# Patient Record
Sex: FEMALE | Race: WHITE | NOT HISPANIC OR LATINO | URBAN - METROPOLITAN AREA
[De-identification: names, ages, dates, MRNs, and addresses within clinical notes are randomized per-mention and may not be internally consistent; named-entity substitution may affect disease eponyms.]

---

## 2017-04-15 ENCOUNTER — EMERGENCY (EMERGENCY)
Facility: HOSPITAL | Age: 62
LOS: 1 days | Discharge: HOME | End: 2017-04-15
Attending: EMERGENCY MEDICINE
Payer: COMMERCIAL

## 2017-04-15 VITALS
WEIGHT: 207.01 LBS | TEMPERATURE: 98 F | SYSTOLIC BLOOD PRESSURE: 152 MMHG | OXYGEN SATURATION: 99 % | HEART RATE: 71 BPM | DIASTOLIC BLOOD PRESSURE: 87 MMHG | RESPIRATION RATE: 16 BRPM | HEIGHT: 65 IN

## 2017-04-15 DIAGNOSIS — S83.8X1A SPRAIN OF OTHER SPECIFIED PARTS OF RIGHT KNEE, INITIAL ENCOUNTER: ICD-10-CM
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
## 2017-04-15 DIAGNOSIS — S89.91XA UNSPECIFIED INJURY OF RIGHT LOWER LEG, INITIAL ENCOUNTER: ICD-10-CM
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
## 2017-04-15 PROCEDURE — 99283 EMERGENCY DEPT VISIT LOW MDM: CPT

## 2017-04-15 PROCEDURE — 73564 X-RAY EXAM KNEE 4 OR MORE: CPT | Mod: 26,RT

## 2017-04-15 PROCEDURE — 99283 EMERGENCY DEPT VISIT LOW MDM: CPT | Mod: 25

## 2017-04-15 PROCEDURE — 73564 X-RAY EXAM KNEE 4 OR MORE: CPT

## 2017-04-15 NOTE — ED STATDOCS - OBJECTIVE STATEMENT
60 y/o F pt presents to ED c/o right knee pain since yesterday. Pt reports she bent down yesterday and heard/felt something pop in the back of her knee. She then was walking down the stairs when her knee gave out again and she fell with no LOC or head injury. Pain worsens with walking. Pt denies recent trauma, nausea, vomiting, calf pain, and back pain. No further complaints or injuries. NKDA.

## 2017-04-15 NOTE — ED STATDOCS - ATTENDING CONTRIBUTION TO CARE
I performed the initial face to face bedside interview with this patient regarding history of present illness, review of symptoms and past medical, social and family history.  I completed an independent physical examination.  I was the initial provider who evaluated this patient.  The history, review of symptoms and examination was documented by the scribe in my presence and I attest to the accuracy of the documentation.  I have signed out the follow up of any pending tests (i.e. labs, radiological studies) to the PA.  I have discussed the patient’s plan of care and disposition with the PA. I performed the initial face to face bedside interview with this patient regarding history of present illness, review of symptoms and past medical, social and family history.  I completed an independent physical examination.  I was the initial provider who evaluated this patient.  The history, review of symptoms and examination was documented by the scribe in my presence and I attest to the accuracy of the documentation.  I have signed out the follow up of any pending tests (i.e. labs, radiological studies) to the NP.  I have discussed the patient’s plan of care and disposition with the NP.

## 2017-04-15 NOTE — ED STATDOCS - MUSCULOSKELETAL, MLM
Small, suprapatellar effusion right lateral knee, positive tenderness in right papilloma falsae, able to right leg raise, able to flex knee, no medial lateral laxity, anterior/posterior laxity

## 2017-04-15 NOTE — ED ADULT TRIAGE NOTE - CHIEF COMPLAINT QUOTE
pt presents to ED with right knee pain s/p kneeling down and "felt a pop". pt states she is having trouble ambulating secondary to pain.

## 2017-04-15 NOTE — ED PROCEDURE NOTE - CPROC ED POST PROC CARE GUIDE1
Keep the cast/splint/dressing clean and dry./Elevate the injured extremity as instructed./Verbal/written post procedure instructions were given to patient/caregiver./Instructed patient/caregiver regarding signs and symptoms of infection./Instructed patient/caregiver to follow-up with primary care physician.

## 2017-04-21 PROBLEM — Z00.00 ENCOUNTER FOR PREVENTIVE HEALTH EXAMINATION: Status: ACTIVE | Noted: 2017-04-21

## 2017-05-15 ENCOUNTER — APPOINTMENT (OUTPATIENT)
Dept: ORTHOPEDIC SURGERY | Facility: CLINIC | Age: 62
End: 2017-05-15

## 2017-12-23 ENCOUNTER — TRANSCRIPTION ENCOUNTER (OUTPATIENT)
Age: 62
End: 2017-12-23

## 2019-12-30 NOTE — ED STATDOCS - PROGRESS NOTE DETAILS
Left message on patient's voicemail regarding Edilia Bronson's comments and recommendations.    Ultrasound order placed.   NP NOTE: Pt seen by intake physician and orders/plan evaluated.  Sub: pt with right knee apin since yesterday s/p kneeling then she heard a pop. took tylenol with some relief, kept elevated, but this morning could not bear weight. came to ED for eval.  Obj: Right knee: no gross deformity of extremity, FROM active and passive, (-) crepitus with ROM, (+) small effusion, (-) ballottement,  (-) tenderness patella, (-) tenderness proximal fibula, (-) tenderness femoral condyles,  ligamentous stability difficult to assess secondary to pain on stressing but no gross instability noted.  (-) drawer/ lachmans, (-) lateral pivot, (-) Tracy.   Plan: image, immobilizer, crutches, ortho fu  Discussed all results with patient. Pt agreeable with plan. Questions answered.